# Patient Record
Sex: FEMALE
[De-identification: names, ages, dates, MRNs, and addresses within clinical notes are randomized per-mention and may not be internally consistent; named-entity substitution may affect disease eponyms.]

---

## 2019-07-17 PROBLEM — Z00.00 ENCOUNTER FOR PREVENTIVE HEALTH EXAMINATION: Status: ACTIVE | Noted: 2019-07-17

## 2019-08-16 ENCOUNTER — APPOINTMENT (OUTPATIENT)
Dept: GASTROENTEROLOGY | Facility: CLINIC | Age: 59
End: 2019-08-16

## 2020-02-28 ENCOUNTER — APPOINTMENT (OUTPATIENT)
Dept: GASTROENTEROLOGY | Facility: CLINIC | Age: 60
End: 2020-02-28
Payer: COMMERCIAL

## 2020-02-28 VITALS
TEMPERATURE: 98.2 F | DIASTOLIC BLOOD PRESSURE: 80 MMHG | HEART RATE: 70 BPM | BODY MASS INDEX: 39.27 KG/M2 | OXYGEN SATURATION: 97 % | SYSTOLIC BLOOD PRESSURE: 157 MMHG | WEIGHT: 230 LBS | HEIGHT: 64 IN

## 2020-02-28 DIAGNOSIS — Z80.0 FAMILY HISTORY OF MALIGNANT NEOPLASM OF DIGESTIVE ORGANS: ICD-10-CM

## 2020-02-28 DIAGNOSIS — E78.00 PURE HYPERCHOLESTEROLEMIA, UNSPECIFIED: ICD-10-CM

## 2020-02-28 DIAGNOSIS — Z78.9 OTHER SPECIFIED HEALTH STATUS: ICD-10-CM

## 2020-02-28 DIAGNOSIS — Z83.3 FAMILY HISTORY OF DIABETES MELLITUS: ICD-10-CM

## 2020-02-28 DIAGNOSIS — R19.8 OTHER SPECIFIED SYMPTOMS AND SIGNS INVOLVING THE DIGESTIVE SYSTEM AND ABDOMEN: ICD-10-CM

## 2020-02-28 DIAGNOSIS — Z82.49 FAMILY HISTORY OF ISCHEMIC HEART DISEASE AND OTHER DISEASES OF THE CIRCULATORY SYSTEM: ICD-10-CM

## 2020-02-28 DIAGNOSIS — R10.13 EPIGASTRIC PAIN: ICD-10-CM

## 2020-02-28 DIAGNOSIS — K21.9 GASTRO-ESOPHAGEAL REFLUX DISEASE W/OUT ESOPHAGITIS: ICD-10-CM

## 2020-02-28 DIAGNOSIS — I10 ESSENTIAL (PRIMARY) HYPERTENSION: ICD-10-CM

## 2020-02-28 PROCEDURE — 99213 OFFICE O/P EST LOW 20 MIN: CPT

## 2020-02-28 RX ORDER — ASPIRIN 81 MG/1
81 TABLET ORAL
Refills: 0 | Status: ACTIVE | COMMUNITY

## 2020-02-28 RX ORDER — LOSARTAN POTASSIUM AND HYDROCHLOROTHIAZIDE 12.5; 1 MG/1; MG/1
100-12.5 TABLET ORAL
Refills: 0 | Status: ACTIVE | COMMUNITY

## 2020-02-28 RX ORDER — POLYETHYLENE GLYCOL 3350, SODIUM CHLORIDE, SODIUM BICARBONATE AND POTASSIUM CHLORIDE WITH LEMON FLAVOR 420; 11.2; 5.72; 1.48 G/4L; G/4L; G/4L; G/4L
420 POWDER, FOR SOLUTION ORAL
Qty: 1 | Refills: 0 | Status: ACTIVE | COMMUNITY
Start: 2020-02-28 | End: 1900-01-01

## 2020-02-28 RX ORDER — OMEPRAZOLE 40 MG/1
40 CAPSULE, DELAYED RELEASE ORAL TWICE DAILY
Qty: 30 | Refills: 3 | Status: ACTIVE | COMMUNITY
Start: 2020-02-28 | End: 1900-01-01

## 2020-02-28 RX ORDER — SIMVASTATIN 40 MG/1
40 TABLET, FILM COATED ORAL
Qty: 30 | Refills: 0 | Status: ACTIVE | COMMUNITY
Start: 2020-02-28 | End: 1900-01-01

## 2020-02-28 RX ORDER — LOSARTAN POTASSIUM AND HYDROCHLOROTHIAZIDE 12.5; 1 MG/1; MG/1
100-12.5 TABLET ORAL DAILY
Qty: 30 | Refills: 0 | Status: ACTIVE | COMMUNITY
Start: 2020-02-28 | End: 1900-01-01

## 2020-02-28 RX ORDER — SIMVASTATIN 40 MG/1
40 TABLET, FILM COATED ORAL
Refills: 0 | Status: ACTIVE | COMMUNITY

## 2020-02-28 RX ORDER — OMEPRAZOLE 40 MG/1
40 CAPSULE, DELAYED RELEASE ORAL
Refills: 0 | Status: ACTIVE | COMMUNITY

## 2020-02-28 NOTE — HISTORY OF PRESENT ILLNESS
[None] : had no significant interval events [Nausea] : denies nausea [Vomiting] : denies vomiting [Yellow Skin Or Eyes (Jaundice)] : denies jaundice [Wt Loss ___ Lbs] : recent [unfilled] ~Upound(s) weight loss [Heartburn] : heartburn [Diarrhea] : diarrhea [Abdominal Pain] : abdominal pain [Abdominal Swelling] : abdominal swelling [Constipation] : constipation [Rectal Pain] : rectal pain [GERD] : gastroesophageal reflux disease [Hiatus Hernia] : hiatus hernia [Cholelithiasis] : cholelithiasis [Cholecystectomy] : cholecystectomy [Wt Gain ___ Lbs] : no recent weight gain [Pancreatitis] : no pancreatitis [Peptic Ulcer Disease] : no peptic ulcer disease [Inflammatory Bowel Disease] : no inflammatory bowel disease [Kidney Stone] : no kidney stone [Irritable Bowel Syndrome] : no irritable bowel syndrome [Diverticulitis] : no diverticulitis [Alcohol Abuse] : no alcohol abuse [Appendectomy] : no appendectomy [Malignancy] : no malignancy [Abdominal Surgery] : no abdominal surgery [de-identified] : The patient states that she is feeling uncomfortable x 1 ½ months. The patient denies any jaundice or pruritus.  The patient complains of left knee pain and lower back pain on NSAIDs. The patient complains of abdominal pain.  The patient describes the abdominal pain as a crampy, burning, intermittent upper abdominal discomfort that is nonradiating in nature.  The abdominal pain is unrelated to meals.  The abdominal pain improves with passing gas or having a bowel movement.  The abdominal pain is described as being mild to moderate in nature.  The abdominal pain occurs at night and in the morning.  The abdominal pain can occur at any time.   The abdominal pain has never awakened the patient from sleep.  The abdominal pain is slightly relieved with certain medication such as proton pump inhibitors, H2 blockers and antacids.  The abdominal pain is associated with abdominal gas and bloating.  The patient denies any nausea or vomiting.  The patient complains of occasional gastroesophageal reflux disease but denies any dysphagia.  The gastroesophageal reflux disease is worse after meals and late at night and in the early morning. The gastroesophageal reflux disease is improved with proton pump inhibitors, H2 blockers and antacids.   The patient denies any atypical chest pain, shortness of breath or palpitations.  The patient denies any diaphoresis. The patient complains of alternating diarrhea/constipation.  The patient has 1 bowel movement every 1 to 2 days.  The patient complains of a change in bowel habits.  The patient complains of a change in caliber of stool.   The diarrhea is described as soft to watery in nature.  The patient denies having mucus discharge with the bowel movements.  The patient denies any bright red blood per rectum, melena or hematemesis.  The patient complains of occasional rectal pain and rectal pruritus.  The patient complains of weight loss but denies any anorexia.  The patient admits to losing 4 to 5 pounds over the past 4 to 5months.  She denies any fevers or chills.  The patient had an upper endoscopy performed at the office on 2017. The upper endoscopy was performed up to the level of the second portion of the duodenum. The upper endoscopy revealed a small hiatal hernia, mild diffuse bile gastritis and multiple small gastric antral polyps. Biopsies were taken of the distal esophagus, antrum, gastric polyp in the antrum of the stomach, body of stomach and duodenum to assess for esophagitis, gastritis and duodenitis. The pathology revealed distal esophagus with esophageal squamous and gastric-type mucosa with mild chronic inflammation with no intestinal metaplasia or fungal organisms, gastric antral mucosa with mild chronic gastritis with no evidence of intestinal metaplasia that was negative for Helicobacter pylori, gastric body mucosa with mild chronic gastritis with no evidence of intestinal metaplasia that was negative for Helicobacter pylori, multiple fragments of a hyperplastic/inflammatory polyp with no evidence of intestinal metaplasia that was negative for Helicobacter pylori (gastric antral polyp) and benign duodenal mucosa with preserved villous architecture with no increase in intraepithelial lymphocytes were features of celiac disease. The patient had a colonoscopy to the cecum performed at the office on 2017. The colonoscopic findings revealed a normal but very long and tortuous colon and small internal hemorrhoids. There were no polyps, masses, diverticulosis, AVMs or colitis noted. The patient tolerated the procedures well. The patient had blood work performed by her PMD on 2016. The blood work was significant for an elevated triglyceride level of 165 mg/dL, an elevated alkaline phosphatase of 132 U/L, an elevated hemoglobin A1c of 6.1% and a low vitamin D level of 17.2 ng/mL. The patient admits to having a prior upper endoscopy and colonoscopy performed by another gastroenterologist, Dr. Jones. According to the patient, the upper endoscopic findings revealed H. pylori gastritis. She was treated with Omeclamox, then Pylera for H. pylori eradication. The second H. pylori breath test was not detected. The colonoscopic findings revealed a poor prep colonoscopy and internal hemorrhoids. The patient admits to a family history ofGI problems. The patient’s father had a history of intestinal cancer and sisters with gallbladder disease, and 2 half-sisters  of gastric cancer and a recent sister  of colon cancer.

## 2020-02-28 NOTE — ASSESSMENT
[FreeTextEntry1] : Abdominal Pain: The patient complains of abdominal pain. The patient is to avoid nonsteroidal anti-inflammatory drugs and aspirin. I recommend continue on a trial of Omeprazole 40 mg once a day for 3 months for the symptoms.  \par Dyspepsia: The patient complains of dyspeptic symptoms.  The patient was advised to abide by an anti-gas diet.  The patient was given a pamphlet for anti-gas.  The patient and I reviewed the anti-gas diet at length. The patient is to start on a trial of Phazyme one tablet 3 times a day p.r.n. abdominal pain and gas.\par GERD: The patient was advised to avoid late-night meals and dietary indiscretions.  The patient was advised to avoid fried and fatty foods.  The patient was advised to abide by an anti-GERD diet. The patient was given a pamphlet for anti-GERD.  The patient and I reviewed the anti-GERD diet at length. I recommend continue on a trial of Omeprazole 40 mg once a day x 3 months for the symptoms.\par Alternating Diarrhea/Constipation: The patient complains of alternating diarrhea/constipation.  I recommend a low residue diet. The patient is to avoid fiber supplementation. The patient is to consider starting a trial of a probiotic such as Align once a day.   If the diarrhea persists, the patient may require sending stool studies for C+S, O+P x3, and C. difficile to assess for an infectious etiology of the diarrhea.  The symptoms are worse after meals.  The patient has a history of cholecystectomy.  I recommend a trial of cholestyramine one packet once a day for possible bile induced diarrhea. The patient agreed and will follow-up to reassess the symptoms.\par Internal Hemorrhoids: The patient is to consider a trial of Anusol H. C. suppositories one per rectum nightly and Anusol HC2 .5% cream apply to affected area twice a day p.r.n. hemorrhoidal bleeding or pain. \par Hiatal Hernia: The patient was advised to avoid late-night meals and dietary indiscretions. The patient was advised to avoid fried and fatty foods. The patient was advised to abide by an anti-GERD diet. The patient was given a pamphlet for anti-GERD. The patient and I reviewed the anti-GERD diet at length. \par Gastritis: The patient has a history of gastritis. The patient is to avoid nonsteroidal anti-inflammatory drugs and aspirin. I recommend continue on  a trial of Omeprazole 40 mg once a day for 3 months for the symptoms. \par Gastric Polyps: The patient was found to have gastric polyps on recent upper endoscopy. The pathology revealed fundic gland polyps. The patient and I discussed the risks of fundic gland polyps. The patient was told him the possibility of increasing size and bleeding secondary to gastric polyps. The patient was advised to follow up in the office to reassess the gastric polyps. \par Family History of GI Malignancy: The patient admits to a family history of GI problems. The patient’s father had a history of intestinal cancer and sisters with gallbladder disease, and 2 half-sisters  of gastric cancer and a recent sister who  of colon cancer. \par I recommend a repeat colonoscopy to reassess for colonic polyps. The patient agreed and will follow up for the procedure. \par I recommend a repeat upper endoscopy to reassess for gastric intestinal metaplasia and family history of gastric cancer. The patient agreed and will follow up for the procedure. \par Upper Endoscopy and Colonoscopy: I recommend an upper endoscopy and colonoscopy to assess the symptoms.  The patient was told of the risks and benefits of the procedure.  The patient was told of the risks of perforation, emergency surgery, bleeding, infections and missed lesions.  The patient agreed and will schedule for the procedure. The patient can take the antihypertensive medication with a sip of water one hour prior to the procedure.  The patient is to be n.p.o. after midnight and bowel prep was given.  The patient is to return for the procedure. \par Follow-up: The patient is to follow-up in the office in 4 weeks to reassess the symptoms. The patient was told to call the office if any further problems. \par \par \par

## 2020-03-02 LAB — HEMOCCULT STL QL: NEGATIVE

## 2020-03-31 ENCOUNTER — APPOINTMENT (OUTPATIENT)
Dept: GASTROENTEROLOGY | Facility: HOSPITAL | Age: 60
End: 2020-03-31